# Patient Record
Sex: MALE | Race: WHITE | URBAN - NONMETROPOLITAN AREA
[De-identification: names, ages, dates, MRNs, and addresses within clinical notes are randomized per-mention and may not be internally consistent; named-entity substitution may affect disease eponyms.]

---

## 2018-05-30 ENCOUNTER — OPTICAL OFFICE (OUTPATIENT)
Dept: URBAN - NONMETROPOLITAN AREA CLINIC 4 | Facility: CLINIC | Age: 23
Setting detail: OPHTHALMOLOGY
End: 2018-05-30
Payer: COMMERCIAL

## 2018-05-30 ENCOUNTER — DOCTOR'S OFFICE (OUTPATIENT)
Dept: URBAN - NONMETROPOLITAN AREA CLINIC 1 | Facility: CLINIC | Age: 23
Setting detail: OPHTHALMOLOGY
End: 2018-05-30
Payer: COMMERCIAL

## 2018-05-30 DIAGNOSIS — Z01.00: ICD-10-CM

## 2018-05-30 DIAGNOSIS — H52.223: ICD-10-CM

## 2018-05-30 DIAGNOSIS — H52.13: ICD-10-CM

## 2018-05-30 PROCEDURE — V2783 LENS, >= 1.66 P/>=1.80 G: HCPCS | Performed by: OPTOMETRIST

## 2018-05-30 PROCEDURE — 92310 CONTACT LENS FITTING OU: CPT | Performed by: OPTOMETRIST

## 2018-05-30 PROCEDURE — V2107 SPHEROCYLINDER 4.25D/12-2D: HCPCS | Performed by: OPTOMETRIST

## 2018-05-30 PROCEDURE — V2020 VISION SVCS FRAMES PURCHASES: HCPCS | Performed by: OPTOMETRIST

## 2018-05-30 PROCEDURE — 92014 COMPRE OPH EXAM EST PT 1/>: CPT | Performed by: OPTOMETRIST

## 2018-05-30 ASSESSMENT — REFRACTION_MANIFEST
OS_VA2: 20/
OD_VA3: 20/
OD_VA1: 20/
OS_VA2: 20/
OS_VA3: 20/
OU_VA: 20/
OD_VA3: 20/
OU_VA: 20/
OS_VA1: 20/
OS_VA1: 20/
OD_VA2: 20/
OD_VA1: 20/
OD_VA2: 20/
OS_VA3: 20/

## 2018-05-30 ASSESSMENT — REFRACTION_OUTSIDERX
OD_VA3: 20/
OD_VA1: 20/25
OS_AXIS: 180
OU_VA: 20/
OD_CYLINDER: -1.25
OD_SPHERE: -7.00
OD_VA2: 20/25
OS_VA1: 20/25
OS_CYLINDER: -0.75
OD_AXIS: 010
OS_VA2: 20/25
OS_VA3: 20/
OS_SPHERE: -7.00

## 2018-05-30 ASSESSMENT — REFRACTION_CURRENTRX
OD_SPHERE: -6.75
OS_SPHERE: -7.00
OS_CYLINDER: -0.75
OS_OVR_VA: 20/
OD_CYLINDER: -1.00
OD_AXIS: 19
OS_OVR_VA: 20/
OS_VPRISM_DIRECTION: SV
OS_OVR_VA: 20/
OD_OVR_VA: 20/
OD_OVR_VA: 20/
OS_AXIS: 1
OD_VPRISM_DIRECTION: SV
OD_OVR_VA: 20/

## 2018-05-30 ASSESSMENT — REFRACTION_AUTOREFRACTION
OS_CYLINDER: -0.25
OS_AXIS: 29
OD_CYLINDER: -0.50
OS_SPHERE: -7.00
OD_AXIS: 11
OD_SPHERE: -6.75

## 2018-05-30 ASSESSMENT — VISUAL ACUITY
OS_BCVA: 20/25-1
OD_BCVA: 20/25

## 2018-05-30 ASSESSMENT — SPHEQUIV_DERIVED
OS_SPHEQUIV: -7.125
OD_SPHEQUIV: -7

## 2018-05-30 ASSESSMENT — CONFRONTATIONAL VISUAL FIELD TEST (CVF)
OD_FINDINGS: FULL
OS_FINDINGS: FULL

## 2018-05-31 ENCOUNTER — OPTICAL OFFICE (OUTPATIENT)
Dept: URBAN - NONMETROPOLITAN AREA CLINIC 4 | Facility: CLINIC | Age: 23
Setting detail: OPHTHALMOLOGY
End: 2018-05-31

## 2018-05-31 DIAGNOSIS — H52.223: ICD-10-CM

## 2018-05-31 PROCEDURE — S0500 DISPOS CONT LENS: HCPCS | Performed by: OPTOMETRIST

## 2019-05-31 ENCOUNTER — DOCTOR'S OFFICE (OUTPATIENT)
Dept: URBAN - NONMETROPOLITAN AREA CLINIC 1 | Facility: CLINIC | Age: 24
Setting detail: OPHTHALMOLOGY
End: 2019-05-31
Payer: COMMERCIAL

## 2019-05-31 DIAGNOSIS — H52.13: ICD-10-CM

## 2019-05-31 PROCEDURE — 92015 DETERMINE REFRACTIVE STATE: CPT | Performed by: OPTOMETRIST

## 2019-05-31 PROCEDURE — 92310 CONTACT LENS FITTING OU: CPT | Performed by: OPTOMETRIST

## 2019-05-31 ASSESSMENT — REFRACTION_MANIFEST
OU_VA: 20/
OS_VA3: 20/
OD_CYLINDER: -1.25
OS_CYLINDER: -0.75
OD_VA2: 20/
OD_VA1: 20/25
OD_VA1: 20/
OS_VA1: 20/25
OS_VA1: 20/
OD_VA2: 20/25
OS_AXIS: 180
OD_AXIS: 010
OS_VA2: 20/
OD_VA3: 20/
OS_VA3: 20/
OU_VA: 20/
OS_SPHERE: -7.00
OD_SPHERE: -7.00
OS_VA2: 20/25
OD_VA3: 20/

## 2019-05-31 ASSESSMENT — CONFRONTATIONAL VISUAL FIELD TEST (CVF)
OD_FINDINGS: FULL
OS_FINDINGS: FULL

## 2019-05-31 ASSESSMENT — SPHEQUIV_DERIVED
OD_SPHEQUIV: -7.625
OS_SPHEQUIV: -7.375

## 2019-06-27 ENCOUNTER — OPTICAL OFFICE (OUTPATIENT)
Dept: URBAN - NONMETROPOLITAN AREA CLINIC 4 | Facility: CLINIC | Age: 24
Setting detail: OPHTHALMOLOGY
End: 2019-06-27

## 2019-06-27 DIAGNOSIS — H52.223: ICD-10-CM

## 2019-06-27 PROCEDURE — S0500 DISPOS CONT LENS: HCPCS | Performed by: OPTOMETRIST

## 2019-06-27 ASSESSMENT — REFRACTION_AUTOREFRACTION
OD_CYLINDER: -1.25
OS_CYLINDER: -0.50
OD_AXIS: 006
OD_SPHERE: -7.75
OS_SPHERE: -8.00
OS_AXIS: 167

## 2019-06-27 ASSESSMENT — REFRACTION_CURRENTRX
OD_AXIS: 006
OS_OVR_VA: 20/
OS_AXIS: 171
OD_VPRISM_DIRECTION: SV
OD_SPHERE: -7.00
OD_OVR_VA: 20/
OD_CYLINDER: -1.25
OS_OVR_VA: 20/
OS_VPRISM_DIRECTION: SV
OS_SPHERE: -7.00
OS_CYLINDER: -0.75
OD_OVR_VA: 20/
OS_OVR_VA: 20/
OD_OVR_VA: 20/

## 2019-06-27 ASSESSMENT — VISUAL ACUITY
OS_BCVA: 20/20-2
OD_BCVA: 20/25+2

## 2019-06-27 ASSESSMENT — SPHEQUIV_DERIVED
OD_SPHEQUIV: -8.375
OS_SPHEQUIV: -8.25

## 2019-11-19 ENCOUNTER — OPTICAL OFFICE (OUTPATIENT)
Dept: URBAN - NONMETROPOLITAN AREA CLINIC 4 | Facility: CLINIC | Age: 24
Setting detail: OPHTHALMOLOGY
End: 2019-11-19

## 2019-11-19 DIAGNOSIS — H52.223: ICD-10-CM

## 2019-11-19 PROCEDURE — S0500 DISPOS CONT LENS: HCPCS | Performed by: OPTOMETRIST

## 2020-04-23 ENCOUNTER — OPTICAL OFFICE (OUTPATIENT)
Dept: URBAN - NONMETROPOLITAN AREA CLINIC 4 | Facility: CLINIC | Age: 25
Setting detail: OPHTHALMOLOGY
End: 2020-04-23

## 2020-04-23 DIAGNOSIS — H52.223: ICD-10-CM

## 2020-04-23 PROCEDURE — S0500 DISPOS CONT LENS: HCPCS | Performed by: OPTOMETRIST

## 2020-06-02 ENCOUNTER — DOCTOR'S OFFICE (OUTPATIENT)
Dept: URBAN - NONMETROPOLITAN AREA CLINIC 1 | Facility: CLINIC | Age: 25
Setting detail: OPHTHALMOLOGY
End: 2020-06-02
Payer: COMMERCIAL

## 2020-06-02 DIAGNOSIS — H52.13: ICD-10-CM

## 2020-06-02 PROBLEM — Z01.00 GOOD OCULAR HEALTH OU ; BOTH EYES: Status: ACTIVE | Noted: 2018-05-30

## 2020-06-02 PROCEDURE — 92310 CONTACT LENS FITTING OU: CPT | Performed by: OPTOMETRIST

## 2020-06-02 PROCEDURE — 92014 COMPRE OPH EXAM EST PT 1/>: CPT | Performed by: OPTOMETRIST

## 2020-06-02 ASSESSMENT — REFRACTION_MANIFEST
OS_VA1: 20/25
OD_AXIS: 010
OS_VA2: 20/25
OS_AXIS: 180
OD_CYLINDER: -1.25
OD_SPHERE: -7.25
OD_VA2: 20/25
OS_CYLINDER: -0.75
OD_VA1: 20/25
OS_SPHERE: -7.25

## 2020-06-02 ASSESSMENT — REFRACTION_CURRENTRX
OD_OVR_VA: 20/
OS_VPRISM_DIRECTION: SV
OD_SPHERE: -7.00
OS_OVR_VA: 20/
OS_SPHERE: -7.00
OS_CYLINDER: -0.75
OD_CYLINDER: -1.25
OS_AXIS: 171
OD_AXIS: 006
OD_VPRISM_DIRECTION: SV

## 2020-06-02 ASSESSMENT — CONFRONTATIONAL VISUAL FIELD TEST (CVF)
OD_FINDINGS: FULL
OS_FINDINGS: FULL

## 2020-06-02 ASSESSMENT — REFRACTION_AUTOREFRACTION
OS_SPHERE: -7.50
OD_CYLINDER: -1.25
OD_AXIS: 006
OS_AXIS: 015
OS_CYLINDER: -0.75
OD_SPHERE: -7.50

## 2020-06-02 ASSESSMENT — SPHEQUIV_DERIVED
OD_SPHEQUIV: -8.125
OD_SPHEQUIV: -7.875
OS_SPHEQUIV: -7.625
OS_SPHEQUIV: -7.875

## 2020-06-02 ASSESSMENT — VISUAL ACUITY
OD_BCVA: 20/25
OS_BCVA: 20/20-1

## 2020-12-15 ENCOUNTER — OPTICAL OFFICE (OUTPATIENT)
Dept: URBAN - NONMETROPOLITAN AREA CLINIC 4 | Facility: CLINIC | Age: 25
Setting detail: OPHTHALMOLOGY
End: 2020-12-15

## 2020-12-15 DIAGNOSIS — H52.223: ICD-10-CM

## 2020-12-15 PROCEDURE — V2020 VISION SVCS FRAMES PURCHASES: HCPCS | Performed by: OPTOMETRIST

## 2020-12-15 PROCEDURE — V2783 LENS, >= 1.66 P/>=1.80 G: HCPCS | Performed by: OPTOMETRIST

## 2020-12-15 PROCEDURE — V2750 ANTI-REFLECTIVE COATING: HCPCS | Performed by: OPTOMETRIST

## 2020-12-15 PROCEDURE — V2111 SPHEROCYLINDR 7.25D/.25-2.25: HCPCS | Performed by: OPTOMETRIST

## 2020-12-29 ENCOUNTER — OPTICAL OFFICE (OUTPATIENT)
Dept: URBAN - NONMETROPOLITAN AREA CLINIC 4 | Facility: CLINIC | Age: 25
Setting detail: OPHTHALMOLOGY
End: 2020-12-29

## 2020-12-29 DIAGNOSIS — H52.223: ICD-10-CM

## 2020-12-29 PROCEDURE — S0500 DISPOS CONT LENS: HCPCS | Performed by: OPTOMETRIST

## 2021-08-23 ENCOUNTER — OPTICAL OFFICE (OUTPATIENT)
Dept: URBAN - NONMETROPOLITAN AREA CLINIC 4 | Facility: CLINIC | Age: 26
Setting detail: OPHTHALMOLOGY
End: 2021-08-23

## 2021-08-23 DIAGNOSIS — H52.223: ICD-10-CM

## 2021-08-23 PROCEDURE — S0500 DISPOS CONT LENS: HCPCS | Performed by: OPTOMETRIST

## 2022-12-16 ENCOUNTER — OPTICAL OFFICE (OUTPATIENT)
Dept: URBAN - NONMETROPOLITAN AREA CLINIC 4 | Facility: CLINIC | Age: 27
Setting detail: OPHTHALMOLOGY
End: 2022-12-16

## 2022-12-16 ENCOUNTER — DOCTOR'S OFFICE (OUTPATIENT)
Dept: URBAN - NONMETROPOLITAN AREA CLINIC 1 | Facility: CLINIC | Age: 27
Setting detail: OPHTHALMOLOGY
End: 2022-12-16

## 2022-12-16 DIAGNOSIS — H52.13: ICD-10-CM

## 2022-12-16 PROBLEM — H04.123 DRY EYE; BOTH EYES: Status: ACTIVE | Noted: 2022-12-16

## 2022-12-16 PROCEDURE — V2020 VISION SVCS FRAMES PURCHASES: HCPCS | Performed by: OPTOMETRIST

## 2022-12-16 PROCEDURE — V2783 LENS, >= 1.66 P/>=1.80 G: HCPCS | Performed by: OPTOMETRIST

## 2022-12-16 PROCEDURE — SELFPAYVIS VISION VISIT SELF PAY: Performed by: OPTOMETRIST

## 2022-12-16 PROCEDURE — S0500 DISPOS CONT LENS: HCPCS | Performed by: OPTOMETRIST

## 2022-12-16 PROCEDURE — V2107 SPHEROCYLINDER 4.25D/12-2D: HCPCS | Performed by: OPTOMETRIST

## 2022-12-16 PROCEDURE — V2750 ANTI-REFLECTIVE COATING: HCPCS | Performed by: OPTOMETRIST

## 2022-12-16 ASSESSMENT — REFRACTION_MANIFEST
OD_AXIS: 007
OD_CYLINDER: -0.75
OS_VA2: 20/25
OS_VA1: 20/20-2
OD_SPHERE: -6.75
OS_CYLINDER: -0.50
OD_VA2: 20/25
OS_SPHERE: -7.00
OS_AXIS: 004
OD_VA1: 20/20-2

## 2022-12-16 ASSESSMENT — SUPERFICIAL PUNCTATE KERATITIS (SPK)
OS_SPK: 1+
OD_SPK: T 1+

## 2022-12-16 ASSESSMENT — SPHEQUIV_DERIVED
OS_SPHEQUIV: -6.875
OS_SPHEQUIV: -7.25
OD_SPHEQUIV: -7.125
OD_SPHEQUIV: -6.75

## 2022-12-16 ASSESSMENT — REFRACTION_CURRENTRX
OS_CYLINDER: -0.75
OD_OVR_VA: 20/
OD_VPRISM_DIRECTION: SV
OS_AXIS: 171
OD_AXIS: 0.00
OD_SPHERE: -7.25
OS_SPHERE: -7.25
OS_OVR_VA: 20/
OS_VPRISM_DIRECTION: SV
OD_CYLINDER: -1.50

## 2022-12-16 ASSESSMENT — REFRACTION_AUTOREFRACTION
OD_SPHERE: -6.50
OS_AXIS: 004
OD_CYLINDER: -0.50
OD_AXIS: 007
OS_SPHERE: -6.75
OS_CYLINDER: -0.25

## 2022-12-16 ASSESSMENT — CONFRONTATIONAL VISUAL FIELD TEST (CVF)
OS_FINDINGS: FULL
OD_FINDINGS: FULL

## 2022-12-16 ASSESSMENT — TONOMETRY
OS_IOP_MMHG: 16
OD_IOP_MMHG: 16

## 2022-12-16 ASSESSMENT — VISUAL ACUITY
OD_BCVA: 20/20-2
OS_BCVA: 20/20-2

## 2023-12-18 ENCOUNTER — DOCTOR'S OFFICE (OUTPATIENT)
Dept: URBAN - NONMETROPOLITAN AREA CLINIC 1 | Facility: CLINIC | Age: 28
Setting detail: OPHTHALMOLOGY
End: 2023-12-18
Payer: COMMERCIAL

## 2023-12-18 DIAGNOSIS — H52.13: ICD-10-CM

## 2023-12-18 PROCEDURE — 92014 COMPRE OPH EXAM EST PT 1/>: CPT | Performed by: OPTOMETRIST

## 2023-12-18 ASSESSMENT — REFRACTION_CURRENTRX
OD_AXIS: 016
OS_SPHERE: -7.00
OD_VPRISM_DIRECTION: SV
OD_OVR_VA: 20/
OS_VPRISM_DIRECTION: SV
OD_SPHERE: -6.25
OD_CYLINDER: -0.75
OS_OVR_VA: 20/
OS_CYLINDER: -0.50
OS_AXIS: 010

## 2023-12-18 ASSESSMENT — REFRACTION_MANIFEST
OD_AXIS: 007
OD_CYLINDER: -1.00
OS_SPHERE: -7.00
OS_VA2: 20/20
OS_AXIS: 004
OS_CYLINDER: -0.50
OD_SPHERE: -7.00
OD_VA1: 20/20-2
OD_VA2: 20/20-2
OS_VA1: 20/20

## 2023-12-18 ASSESSMENT — REFRACTION_AUTOREFRACTION
OS_SPHERE: -7.50
OD_SPHERE: -7.50
OS_AXIS: 164
OD_CYLINDER: -1.50
OD_AXIS: 172
OS_CYLINDER: -0.50

## 2023-12-18 ASSESSMENT — SPHEQUIV_DERIVED
OS_SPHEQUIV: -7.25
OS_SPHEQUIV: -7.75
OD_SPHEQUIV: -8.25
OD_SPHEQUIV: -7.5

## 2023-12-18 ASSESSMENT — CONFRONTATIONAL VISUAL FIELD TEST (CVF)
OD_FINDINGS: FULL
OS_FINDINGS: FULL

## 2023-12-18 ASSESSMENT — SUPERFICIAL PUNCTATE KERATITIS (SPK)
OD_SPK: T 1+
OS_SPK: 1+

## 2024-03-29 ENCOUNTER — HOSPITAL ENCOUNTER (EMERGENCY)
Facility: HOSPITAL | Age: 29
Discharge: HOME | End: 2024-03-29
Attending: EMERGENCY MEDICINE
Payer: COMMERCIAL

## 2024-03-29 ENCOUNTER — APPOINTMENT (EMERGENCY)
Dept: RADIOLOGY | Facility: HOSPITAL | Age: 29
End: 2024-03-29
Payer: COMMERCIAL

## 2024-03-29 VITALS
HEIGHT: 69 IN | BODY MASS INDEX: 46.65 KG/M2 | HEART RATE: 103 BPM | TEMPERATURE: 98.4 F | RESPIRATION RATE: 13 BRPM | SYSTOLIC BLOOD PRESSURE: 156 MMHG | OXYGEN SATURATION: 95 % | WEIGHT: 315 LBS | DIASTOLIC BLOOD PRESSURE: 70 MMHG

## 2024-03-29 DIAGNOSIS — R07.89 ATYPICAL CHEST PAIN: Primary | ICD-10-CM

## 2024-03-29 LAB
2HR DELTA HS TROPONIN: 1 NG/L
ALBUMIN SERPL BCP-MCNC: 5.2 G/DL (ref 3.5–5)
ALP SERPL-CCNC: 58 U/L (ref 34–104)
ALT SERPL W P-5'-P-CCNC: 60 U/L (ref 7–52)
ANION GAP SERPL CALCULATED.3IONS-SCNC: 8 MMOL/L (ref 4–13)
AST SERPL W P-5'-P-CCNC: 30 U/L (ref 13–39)
ATRIAL RATE: 122 BPM
BASOPHILS # BLD AUTO: 0.02 THOUSANDS/ÂΜL (ref 0–0.1)
BASOPHILS NFR BLD AUTO: 0 % (ref 0–1)
BILIRUB SERPL-MCNC: 0.6 MG/DL (ref 0.2–1)
BUN SERPL-MCNC: 14 MG/DL (ref 5–25)
CALCIUM SERPL-MCNC: 9.9 MG/DL (ref 8.4–10.2)
CARDIAC TROPONIN I PNL SERPL HS: 3 NG/L
CARDIAC TROPONIN I PNL SERPL HS: 4 NG/L
CHLORIDE SERPL-SCNC: 103 MMOL/L (ref 96–108)
CO2 SERPL-SCNC: 26 MMOL/L (ref 21–32)
CREAT SERPL-MCNC: 1.05 MG/DL (ref 0.6–1.3)
D DIMER PPP FEU-MCNC: <0.27 UG/ML FEU
EOSINOPHIL # BLD AUTO: 0.01 THOUSAND/ÂΜL (ref 0–0.61)
EOSINOPHIL NFR BLD AUTO: 0 % (ref 0–6)
ERYTHROCYTE [DISTWIDTH] IN BLOOD BY AUTOMATED COUNT: 12.8 % (ref 11.6–15.1)
GFR SERPL CREATININE-BSD FRML MDRD: 94 ML/MIN/1.73SQ M
GLUCOSE SERPL-MCNC: 106 MG/DL (ref 65–140)
HCT VFR BLD AUTO: 47.4 % (ref 36.5–49.3)
HGB BLD-MCNC: 16.4 G/DL (ref 12–17)
IMM GRANULOCYTES # BLD AUTO: 0.03 THOUSAND/UL (ref 0–0.2)
IMM GRANULOCYTES NFR BLD AUTO: 0 % (ref 0–2)
LYMPHOCYTES # BLD AUTO: 1.72 THOUSANDS/ÂΜL (ref 0.6–4.47)
LYMPHOCYTES NFR BLD AUTO: 20 % (ref 14–44)
MCH RBC QN AUTO: 28.3 PG (ref 26.8–34.3)
MCHC RBC AUTO-ENTMCNC: 34.6 G/DL (ref 31.4–37.4)
MCV RBC AUTO: 82 FL (ref 82–98)
MONOCYTES # BLD AUTO: 0.63 THOUSAND/ÂΜL (ref 0.17–1.22)
MONOCYTES NFR BLD AUTO: 7 % (ref 4–12)
NEUTROPHILS # BLD AUTO: 6.15 THOUSANDS/ÂΜL (ref 1.85–7.62)
NEUTS SEG NFR BLD AUTO: 73 % (ref 43–75)
NRBC BLD AUTO-RTO: 0 /100 WBCS
P AXIS: 47 DEGREES
PLATELET # BLD AUTO: 262 THOUSANDS/UL (ref 149–390)
PMV BLD AUTO: 9.5 FL (ref 8.9–12.7)
POTASSIUM SERPL-SCNC: 3.8 MMOL/L (ref 3.5–5.3)
PR INTERVAL: 140 MS
PROT SERPL-MCNC: 8.5 G/DL (ref 6.4–8.4)
QRS AXIS: 74 DEGREES
QRSD INTERVAL: 78 MS
QT INTERVAL: 288 MS
QTC INTERVAL: 410 MS
RBC # BLD AUTO: 5.8 MILLION/UL (ref 3.88–5.62)
SODIUM SERPL-SCNC: 137 MMOL/L (ref 135–147)
T WAVE AXIS: 29 DEGREES
VENTRICULAR RATE: 122 BPM
WBC # BLD AUTO: 8.56 THOUSAND/UL (ref 4.31–10.16)

## 2024-03-29 PROCEDURE — 85379 FIBRIN DEGRADATION QUANT: CPT | Performed by: EMERGENCY MEDICINE

## 2024-03-29 PROCEDURE — 93010 ELECTROCARDIOGRAM REPORT: CPT | Performed by: INTERNAL MEDICINE

## 2024-03-29 PROCEDURE — 71045 X-RAY EXAM CHEST 1 VIEW: CPT

## 2024-03-29 PROCEDURE — 36415 COLL VENOUS BLD VENIPUNCTURE: CPT | Performed by: EMERGENCY MEDICINE

## 2024-03-29 PROCEDURE — 99285 EMERGENCY DEPT VISIT HI MDM: CPT

## 2024-03-29 PROCEDURE — 84484 ASSAY OF TROPONIN QUANT: CPT | Performed by: EMERGENCY MEDICINE

## 2024-03-29 PROCEDURE — 85025 COMPLETE CBC W/AUTO DIFF WBC: CPT | Performed by: EMERGENCY MEDICINE

## 2024-03-29 PROCEDURE — 80053 COMPREHEN METABOLIC PANEL: CPT | Performed by: EMERGENCY MEDICINE

## 2024-03-29 PROCEDURE — 93005 ELECTROCARDIOGRAM TRACING: CPT

## 2024-03-29 PROCEDURE — 99285 EMERGENCY DEPT VISIT HI MDM: CPT | Performed by: EMERGENCY MEDICINE

## 2024-03-29 RX ORDER — RIBOFLAVIN (VITAMIN B2) 100 MG
100 TABLET ORAL DAILY
COMMUNITY

## 2024-03-29 NOTE — DISCHARGE INSTRUCTIONS
We recommend follow-up with your primary care provider.  He may wish to do additional testing  that may include blood testing and stress testing.  Please return with any worsening symptoms.

## 2024-03-29 NOTE — ED PROVIDER NOTES
History  Chief Complaint   Patient presents with    Chest Pain     Sent from  for further evaluation of intermittent chest tightness.      28-year-old male to the emergency room with chief complaint of chest tightness.  Had chest tightness last night lasting for a few minutes.  Again today lasting longer.  It was nonradiating.  No shortness of breath.  No diaphoresis.  No nausea or vomiting.  Has personal history of hypertriglyceridemia.  Not take medication for same.  Presented with elevated blood pressure.  Does not take medication for same.  No diabetes.  Does not smoke.  Has family history of elevated triglycerides.  Remote family history of heart disease.    Patient also took a plane trip to [place] approximately 3 weeks ago.      History provided by:  Patient and parent  Chest Pain  Pain location:  Substernal area  Pain quality: tightness    Pain radiates to:  Does not radiate  Associated symptoms: no diaphoresis, no fever, no nausea, no palpitations, no shortness of breath and not vomiting    Risk factors: high cholesterol, male sex and obesity    Risk factors: no coronary artery disease, no diabetes mellitus and no smoking        Prior to Admission Medications   Prescriptions Last Dose Informant Patient Reported? Taking?   Ascorbic Acid (vitamin C) 100 MG tablet 3/29/2024  Yes Yes   Sig: Take 100 mg by mouth daily      Facility-Administered Medications: None       Past Medical History:   Diagnosis Date    Dyslipidemia     Elevated LFTs     SABINA (obstructive sleep apnea)        History reviewed. No pertinent surgical history.    History reviewed. No pertinent family history.  I have reviewed and agree with the history as documented.    E-Cigarette/Vaping    E-Cigarette Use Never User      E-Cigarette/Vaping Substances     Social History     Tobacco Use    Smoking status: Never    Smokeless tobacco: Never   Vaping Use    Vaping status: Never Used   Substance Use Topics    Alcohol use: Yes    Drug use: Not  Currently       Review of Systems   Constitutional: Negative.  Negative for diaphoresis and fever.   Respiratory: Negative.  Negative for shortness of breath and wheezing.    Cardiovascular:  Positive for chest pain. Negative for palpitations.   Gastrointestinal: Negative.  Negative for diarrhea, nausea and vomiting.   All other systems reviewed and are negative.      Physical Exam  Physical Exam  Vitals and nursing note reviewed.   Constitutional:       General: He is not in acute distress.     Appearance: He is obese. He is not ill-appearing or toxic-appearing.   HENT:      Head: Normocephalic and atraumatic.      Right Ear: External ear normal.      Left Ear: External ear normal.      Nose: Nose normal.      Mouth/Throat:      Mouth: Mucous membranes are moist.   Eyes:      Pupils: Pupils are equal, round, and reactive to light.   Pulmonary:      Effort: Pulmonary effort is normal. No respiratory distress.      Breath sounds: No decreased breath sounds, wheezing or rhonchi.   Abdominal:      General: Abdomen is flat. There is no distension.   Musculoskeletal:         General: No signs of injury.      Right lower leg: No tenderness. No edema.      Left lower leg: No tenderness. No edema.   Skin:     Coloration: Skin is not pale.   Neurological:      General: No focal deficit present.      Mental Status: He is alert.   Psychiatric:         Mood and Affect: Mood normal.         Thought Content: Thought content normal.         Judgment: Judgment normal.         Vital Signs  ED Triage Vitals [03/29/24 1358]   Temperature Pulse Respirations Blood Pressure SpO2   98.4 °F (36.9 °C) (!) 113 20 (!) 175/110 98 %      Temp Source Heart Rate Source Patient Position - Orthostatic VS BP Location FiO2 (%)   Oral Monitor Lying Right arm --      Pain Score       No Pain           Vitals:    03/29/24 1358 03/29/24 1500 03/29/24 1600 03/29/24 1700   BP: (!) 175/110 146/76 144/78    Pulse: (!) 113 (!) 112 (!) 108 103   Patient  Position - Orthostatic VS: Lying Sitting Sitting          Visual Acuity      ED Medications  Medications - No data to display    Diagnostic Studies  Results Reviewed       Procedure Component Value Units Date/Time    HS Troponin I 2hr [[id number]]  (Normal) Collected: 03/29/24 1559    Lab Status: Final result Specimen: Blood from Arm, Right Updated: 03/29/24 1627     hs TnI 2hr 4 ng/L      Delta 2hr hsTnI 1 ng/L     HS Troponin I 4hr [[id number]]     Lab Status: No result Specimen: Blood     D-Dimer [[id number]]  (Normal) Collected: 03/29/24 1407    Lab Status: Final result Specimen: Blood from Arm, Right Updated: 03/29/24 1446     D-Dimer, Quant <0.27 ug/ml FEU     HS Troponin 0hr (reflex protocol) [[id number]]  (Normal) Collected: 03/29/24 1407    Lab Status: Final result Specimen: Blood from Arm, Right Updated: 03/29/24 1436     hs TnI 0hr 3 ng/L     Comprehensive metabolic panel [[id number]]  (Abnormal) Collected: 03/29/24 1407    Lab Status: Final result Specimen: Blood from Arm, Right Updated: 03/29/24 1432     Sodium 137 mmol/L      Potassium 3.8 mmol/L      Chloride 103 mmol/L      CO2 26 mmol/L      ANION GAP 8 mmol/L      BUN 14 mg/dL      Creatinine 1.05 mg/dL      Glucose 106 mg/dL      Calcium 9.9 mg/dL      AST 30 U/L      ALT 60 U/L      Alkaline Phosphatase 58 U/L      Total Protein 8.5 g/dL      Albumin 5.2 g/dL      Total Bilirubin 0.60 mg/dL      eGFR 94 ml/min/1.73sq m     Narrative:      [employer] guidelines for Chronic Kidney Disease (CKD):     Stage 1 with normal or high GFR (GFR > 90 mL/min/1.73 square meters)    Stage 2 Mild CKD (GFR = 60-89 mL/min/1.73 square meters)    Stage 3A Moderate CKD (GFR = 45-59 mL/min/1.73 square meters)    Stage 3B Moderate CKD (GFR = 30-44 mL/min/1.73 square meters)    Stage 4 Severe CKD (GFR = 15-29 mL/min/1.73 square meters)    Stage 5 End Stage CKD (GFR <15 mL/min/1.73 square meters)  Note: GFR calculation is accurate only with a steady  state creatinine    CBC and differential [[id number]]  (Abnormal) Collected: 03/29/24 1407    Lab Status: Final result Specimen: Blood from Arm, Right Updated: 03/29/24 1412     WBC 8.56 Thousand/uL      RBC 5.80 Million/uL      Hemoglobin 16.4 g/dL      Hematocrit 47.4 %      MCV 82 fL      MCH 28.3 pg      MCHC 34.6 g/dL      RDW 12.8 %      MPV 9.5 fL      Platelets 262 Thousands/uL      nRBC 0 /100 WBCs      Neutrophils Relative 73 %      Immature Grans % 0 %      Lymphocytes Relative 20 %      Monocytes Relative 7 %      Eosinophils Relative 0 %      Basophils Relative 0 %      Neutrophils Absolute 6.15 Thousands/µL      Absolute Immature Grans 0.03 Thousand/uL      Absolute Lymphocytes 1.72 Thousands/µL      Absolute Monocytes 0.63 Thousand/µL      Eosinophils Absolute 0.01 Thousand/µL      Basophils Absolute 0.02 Thousands/µL                    XR chest 1 view portable   ED Interpretation by Alex Addison DO (03/29 1438)   No active disease      Final Result by Luis Anderson MD (03/29 1555)      No acute cardiopulmonary disease.            Workstation performed: [id number]                    Procedures  ECG 12 Lead Documentation Only    Date/Time: 3/29/2024 2:21 PM    Performed by: Alex Addison DO  Authorized by: Alex Addison DO    ECG reviewed by me, the ED Provider: yes    Patient location:  ED  Interpretation:     Interpretation: normal    Rate:     ECG rate assessment: tachycardic    Rhythm:     Rhythm: sinus tachycardia    Ectopy:     Ectopy: none    QRS:     QRS axis:  Normal  Conduction:     Conduction: normal    ST segments:     ST segments:  Non-specific  T waves:     T waves: non-specific             ED Course  ED Course as of 03/29/24 1736   Fri Mar 29, 2024   1437 hs TnI 0hr: 3   1511 D-Dimer, Quant: <0.27   1518 Blood pressure improved.   1533 Patient was updated.  Patient declined pain medications.   1707 Delta 2hr hsTnI: 1   1707 Delta is 1.   1708 Heart score 1.  EKG normal.   Suspect musculoskeletal type chest pain.  However, have also strongly recommended patient follow-up with his primary care provider given the history he is provided to me regarding his triglycerides.             HEART Risk Score      Flowsheet Row Most Recent Value   Heart Score Risk Calculator    History 0 Filed at: 03/29/2024 1708   ECG 0 Filed at: 03/29/2024 1708   Age 0 Filed at: 03/29/2024 1708   Risk Factors 1 Filed at: 03/29/2024 1708   Troponin 0 Filed at: 03/29/2024 1708   HEART Score 1 Filed at: 03/29/2024 1708                          SBIRT 20yo+      Flowsheet Row Most Recent Value   Initial Alcohol Screen: US AUDIT-C     1. How often do you have a drink containing alcohol? 0 Filed at: 03/29/2024 1408   2. How many drinks containing alcohol do you have on a typical day you are drinking?  0 Filed at: 03/29/2024 1408   3a. Male UNDER 65: How often do you have five or more drinks on one occasion? 0 Filed at: 03/29/2024 1408   Audit-C Score 0 Filed at: 03/29/2024 1408   KIRSTIN: How many times in the past year have you...    Used an illegal drug or used a prescription medication for non-medical reasons? Never Filed at: 03/29/2024 1408                      Medical Decision Making  Patient presented to the emergency department and a MSE was performed. The patient was evaluated for complaint related to acute chest pain.  Patient is potentially at risk for, but not limited to, acute coronary syndrome, arrhythmia, myocardial infarction, pulmonary embolism, pneumothorax, infectious process of the lungs such as pneumonia, GERD, esophagitis, muscle strain, or costochondritis.  Several of these diagnoses have been evaluated and ruled out by history and physical.  As needed, patient will be further evaluated with laboratory and imaging studies.  Higher level diagnostics, such as CT imaging or ultrasound, may also be required.  Please see work-up portion of the note for further evaluation of patient's risk.  Socioeconomic  factors were also considered as part of the decision-making process.  Unless otherwise stated in the chart or patient is admitted as elsewhere documented, any previously prescribed medications will be maintained.        Problems Addressed:  Atypical chest pain: acute illness or injury    Amount and/or Complexity of Data Reviewed  Labs: ordered. Decision-making details documented in ED Course.  Radiology: ordered and independent interpretation performed.             Disposition  Final diagnoses:   Atypical chest pain     Time reflects when diagnosis was documented in both MDM as applicable and the Disposition within this note       Time User Action Codes Description Comment    3/29/2024  5:35 PM Alex Addison Add [R07.89] Atypical chest pain           ED Disposition       ED Disposition   Discharge    Condition   Stable    Date/Time   Fri Mar 29, 2024 1712    Comment   Abram Bashir discharge to home/self care.                   Follow-up Information       Follow up With Specialties Details Why Contact Info    Chevy Basurto MD Family Medicine In 1 week Even in [place] [id number]  [phone]              Patient's Medications   Discharge Prescriptions    No medications on file       No discharge procedures on file.    PDMP Review       None            ED Provider  Electronically Signed by             Alex Addison DO  03/29/24 2697

## 2024-12-20 ENCOUNTER — RX ONLY (RX ONLY)
Age: 29
End: 2024-12-20

## 2024-12-20 ENCOUNTER — DOCTOR'S OFFICE (OUTPATIENT)
Dept: URBAN - NONMETROPOLITAN AREA CLINIC 1 | Facility: CLINIC | Age: 29
Setting detail: OPHTHALMOLOGY
End: 2024-12-20
Payer: COMMERCIAL

## 2024-12-20 DIAGNOSIS — H52.13: ICD-10-CM

## 2024-12-20 PROCEDURE — SELFPAYVIS VISION VISIT SELF PAY: Performed by: OPTOMETRIST

## 2024-12-20 ASSESSMENT — REFRACTION_MANIFEST
OD_SPHERE: -7.00
OS_CYLINDER: -0.50
OS_VA1: 20/20
OS_SPHERE: -7.00
OS_AXIS: 004
OD_AXIS: 007
OS_VA2: 20/20
OD_VA2: 20/20-2
OD_VA1: 20/20-2
OD_CYLINDER: -0.75

## 2024-12-20 ASSESSMENT — REFRACTION_CURRENTRX
OD_SPHERE: -6.25
OS_AXIS: 002
OD_VPRISM_DIRECTION: SV
OS_CYLINDER: -0.25
OD_OVR_VA: 20/
OS_OVR_VA: 20/
OS_SPHERE: -7.00
OD_CYLINDER: -0.50
OD_AXIS: 014
OS_VPRISM_DIRECTION: SV

## 2024-12-20 ASSESSMENT — CONFRONTATIONAL VISUAL FIELD TEST (CVF)
OS_FINDINGS: FULL
OD_FINDINGS: FULL

## 2024-12-20 ASSESSMENT — TONOMETRY
OD_IOP_MMHG: 16
OS_IOP_MMHG: 16

## 2024-12-20 ASSESSMENT — VISUAL ACUITY
OD_BCVA: 20/20-2
OS_BCVA: 20/25+2

## 2024-12-20 ASSESSMENT — REFRACTION_AUTOREFRACTION
OS_SPHERE: -7.00
OD_SPHERE: -6.75
OD_CYLINDER: -0.75
OS_CYLINDER: -0.25
OD_AXIS: 011
OS_AXIS: 017

## 2024-12-20 ASSESSMENT — SUPERFICIAL PUNCTATE KERATITIS (SPK)
OD_SPK: T 1+
OS_SPK: 1+